# Patient Record
Sex: FEMALE | Race: WHITE | NOT HISPANIC OR LATINO | Employment: FULL TIME | URBAN - METROPOLITAN AREA
[De-identification: names, ages, dates, MRNs, and addresses within clinical notes are randomized per-mention and may not be internally consistent; named-entity substitution may affect disease eponyms.]

---

## 2024-03-25 ENCOUNTER — OCCUPATIONAL MEDICINE (OUTPATIENT)
Dept: URGENT CARE | Facility: CLINIC | Age: 34
End: 2024-03-25
Payer: COMMERCIAL

## 2024-03-25 VITALS
SYSTOLIC BLOOD PRESSURE: 116 MMHG | DIASTOLIC BLOOD PRESSURE: 64 MMHG | BODY MASS INDEX: 28.26 KG/M2 | WEIGHT: 149.69 LBS | TEMPERATURE: 99.4 F | HEART RATE: 76 BPM | OXYGEN SATURATION: 94 % | HEIGHT: 61 IN | RESPIRATION RATE: 14 BRPM

## 2024-03-25 DIAGNOSIS — S00.83XA FACIAL CONTUSION, INITIAL ENCOUNTER: ICD-10-CM

## 2024-03-25 PROCEDURE — 99203 OFFICE O/P NEW LOW 30 MIN: CPT | Performed by: NURSE PRACTITIONER

## 2024-03-25 PROCEDURE — 1125F AMNT PAIN NOTED PAIN PRSNT: CPT | Performed by: NURSE PRACTITIONER

## 2024-03-25 PROCEDURE — 3074F SYST BP LT 130 MM HG: CPT | Performed by: NURSE PRACTITIONER

## 2024-03-25 PROCEDURE — 3078F DIAST BP <80 MM HG: CPT | Performed by: NURSE PRACTITIONER

## 2024-03-25 ASSESSMENT — PAIN SCALES - GENERAL: PAINLEVEL: 2=MINIMAL-SLIGHT
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
## 2024-03-27 ENCOUNTER — OCCUPATIONAL MEDICINE (OUTPATIENT)
Dept: URGENT CARE | Facility: CLINIC | Age: 34
End: 2024-03-27
Payer: COMMERCIAL

## 2024-03-27 VITALS
OXYGEN SATURATION: 95 % | SYSTOLIC BLOOD PRESSURE: 118 MMHG | RESPIRATION RATE: 16 BRPM | BODY MASS INDEX: 28.39 KG/M2 | TEMPERATURE: 98.7 F | DIASTOLIC BLOOD PRESSURE: 68 MMHG | HEART RATE: 75 BPM | WEIGHT: 150.35 LBS | HEIGHT: 61 IN

## 2024-03-27 DIAGNOSIS — S00.83XD FACIAL CONTUSION, SUBSEQUENT ENCOUNTER: ICD-10-CM

## 2024-03-27 ASSESSMENT — PAIN SCALES - GENERAL: PAINLEVEL: 1=MINIMAL PAIN
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
## 2024-04-03 ENCOUNTER — APPOINTMENT (OUTPATIENT)
Dept: URGENT CARE | Facility: CLINIC | Age: 34
End: 2024-04-03
Payer: COMMERCIAL

## 2024-04-03 VITALS
OXYGEN SATURATION: 97 % | RESPIRATION RATE: 14 BRPM | TEMPERATURE: 98.9 F | HEART RATE: 82 BPM | SYSTOLIC BLOOD PRESSURE: 106 MMHG | BODY MASS INDEX: 28.3 KG/M2 | HEIGHT: 61 IN | DIASTOLIC BLOOD PRESSURE: 60 MMHG | WEIGHT: 149.91 LBS

## 2024-04-03 DIAGNOSIS — S00.83XD FACIAL CONTUSION, SUBSEQUENT ENCOUNTER: ICD-10-CM

## 2024-04-03 DIAGNOSIS — S06.0X0D CONCUSSION WITHOUT LOSS OF CONSCIOUSNESS, SUBSEQUENT ENCOUNTER: ICD-10-CM

## 2024-04-03 PROCEDURE — 3078F DIAST BP <80 MM HG: CPT | Performed by: PHYSICIAN ASSISTANT

## 2024-04-03 PROCEDURE — 99213 OFFICE O/P EST LOW 20 MIN: CPT | Performed by: PHYSICIAN ASSISTANT

## 2024-04-03 PROCEDURE — 3074F SYST BP LT 130 MM HG: CPT | Performed by: PHYSICIAN ASSISTANT
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
## 2024-04-10 ENCOUNTER — OCCUPATIONAL MEDICINE (OUTPATIENT)
Dept: URGENT CARE | Facility: CLINIC | Age: 34
End: 2024-04-10
Payer: COMMERCIAL

## 2024-04-10 VITALS
TEMPERATURE: 99.4 F | HEART RATE: 81 BPM | BODY MASS INDEX: 28.6 KG/M2 | RESPIRATION RATE: 12 BRPM | WEIGHT: 151.46 LBS | OXYGEN SATURATION: 96 % | HEIGHT: 61 IN | SYSTOLIC BLOOD PRESSURE: 108 MMHG | DIASTOLIC BLOOD PRESSURE: 60 MMHG

## 2024-04-10 DIAGNOSIS — S06.0X0D CONCUSSION WITHOUT LOSS OF CONSCIOUSNESS, SUBSEQUENT ENCOUNTER: Primary | ICD-10-CM

## 2024-04-10 DIAGNOSIS — S00.83XD FACIAL CONTUSION, SUBSEQUENT ENCOUNTER: ICD-10-CM

## 2024-04-10 PROCEDURE — 3074F SYST BP LT 130 MM HG: CPT | Performed by: PHYSICIAN ASSISTANT

## 2024-04-10 PROCEDURE — 3078F DIAST BP <80 MM HG: CPT | Performed by: PHYSICIAN ASSISTANT

## 2024-04-10 PROCEDURE — 99213 OFFICE O/P EST LOW 20 MIN: CPT | Performed by: PHYSICIAN ASSISTANT

## 2024-04-10 PROCEDURE — 1125F AMNT PAIN NOTED PAIN PRSNT: CPT | Performed by: PHYSICIAN ASSISTANT

## 2024-04-10 ASSESSMENT — PAIN SCALES - GENERAL: PAINLEVEL: 1=MINIMAL PAIN
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
## 2024-04-10 NOTE — LETTER
PHYSICIAN’S AND CHIROPRACTIC PHYSICIAN'S                       PROGRESS REPORT  CERTIFICATION OF DISABILITY Claim Number:        Social Security Number:     Patient’s Name:Yen Alvarado Date of Injury:  3/25/2024     Employer:  [employer]  Name of O (if applicable)   Patient’s Job Description/Occupation:  Teacher    Previous Injuries/Diseases/Surgeries Contributing to the Condition:      Diagnosis:  The primary encounter diagnosis was Concussion without loss of consciousness, subsequent encounter. A diagnosis of Facial contusion, subsequent encounter was also pertinent to this visit.   Related to the Industrial Injury? Explain:  Yes    Objective Medical Findings:  Physical exam: Cranial nerves II through XII grossly intact, PERRLA, EOMI bilaterally, no slurred speech, no facial droop.  Full range of motion of neck in all planes of movement without pain.  Bilateral upper and lower extremity show full range of motion with good coordination, 5 out of 5 strength.  Patient walks with normal gait, good balance.    []  None - Discharged                         Stable     []  Yes     []  No                           Ratable     []  Yes     []  No   []  Generally Improved                        []  Condition Worsened                              []  Condition Same         May Have Suffered a Permanent Disability     []  Yes     []  No   Treatment Plan: Medication  Comments:Patient can continue OTC Advil dual action for headaches, this seems to work the best for the patient.     [] No Change in Therapy                    [] PT/OT Prescribed                   [] Medication May be Used While Working    [] Case Management                          [] PT/OT Discontinued  []  Consultation    [] Further Diagnostic Studies    []  Prescription(s)                        [] Released to FULL DUTY /No Restrictions on (Date):                                                                                            [] Certified TOTALLY TEMPORARILY DISABLED (Indicate Dates): From:                       To:                               [] Released to RESTRICTED/Modified Duty on (Date): From:                              To:                                                                   Restrictions Are:     []  Permanent[]  Temporary   [] No Sitting                   []  No Standing          []  No Pulling             []  Other:                                              [] No Bending at Waist  []  No Stooping          []  No Lifting                                                                            [] No Carrying                []  No Walking           []  Lifting Restricted to (lbs.):   [] No Pushing                 []  No Climbing         []  No Reaching Above Shoulders   Date of Next Visit: 4/19/2024 Date of this Exam:  4/10/2024 Physician/Chiropractic Physician Name: Jackie Lowe P.A.-C. Physician/Chiropractic Physician Signature:      D-39 (Rev. 2/24)

## 2024-04-10 NOTE — LETTER
PHYSICIAN’S AND CHIROPRACTIC PHYSICIAN'S                       PROGRESS REPORT  CERTIFICATION OF DISABILITY Claim Number:        Social Security Number:     Patient’s Name:Yen Alvarado Date of Injury:  3/25/2024     Employer:  [employer] *** Name of O (if applicable)   Patient’s Job Description/Occupation:  Teacher ***   Previous Injuries/Diseases/Surgeries Contributing to the Condition:      Diagnosis:  The primary encounter diagnosis was Concussion without loss of consciousness, subsequent encounter. A diagnosis of Facial contusion, subsequent encounter was also pertinent to this visit.   Related to the Industrial Injury? Explain:  Yes ***   Objective Medical Findings:  Physical exam: Cranial nerves II through XII grossly intact, PERRLA, EOMI bilaterally, no slurred speech, no facial droop.  Full range of motion of neck in all planes of movement without pain.  Bilateral upper and lower extremity show full range of motion with good coordination, 5 out of 5 strength.  Patient walks with normal gait, good balance.    []  None - Discharged                         Stable     []  Yes     []  No                           Ratable     []  Yes     []  No   []  Generally Improved                        []  Condition Worsened                              []  Condition Same         May Have Suffered a Permanent Disability     []  Yes     []  No   Treatment Plan: Medication  Comments:Patient can continue OTC Advil dual action for headaches, this seems to work the best for the patient.     [] No Change in Therapy                    [] PT/OT Prescribed                   [] Medication May be Used While Working    [] Case Management                          [] PT/OT Discontinued  []  Consultation    [] Further Diagnostic Studies    []  Prescription(s)                        [] Released to FULL DUTY /No Restrictions on (Date):                                                                                            [] Certified TOTALLY TEMPORARILY DISABLED (Indicate Dates): From:                       To:                               [] Released to RESTRICTED/Modified Duty on (Date): From:                              To:                                                                   Restrictions Are:     []  Permanent[]  Temporary   [] No Sitting                   []  No Standing          []  No Pulling             []  Other:                                              [] No Bending at Waist  []  No Stooping          []  No Lifting                                                                            [] No Carrying                []  No Walking           []  Lifting Restricted to (lbs.):   [] No Pushing                 []  No Climbing         []  No Reaching Above Shoulders   Date of Next Visit: 4/19/2024 Date of this Exam:  4/10/2024 Physician/Chiropractic Physician Name: Jackie Lowe P.A.-C. Physician/Chiropractic Physician Signature:      D-39 (Rev. 2/24)

## 2024-04-10 NOTE — LETTER
PHYSICIAN’S AND CHIROPRACTIC PHYSICIAN'S                       PROGRESS REPORT  CERTIFICATION OF DISABILITY Claim Number:        Social Security Number:     Patient’s Name:Yen Alvarado Date of Injury:  3/25/2024     Employer:  [employer] *** Name of O (if applicable)   Patient’s Job Description/Occupation:  Teacher ***   Previous Injuries/Diseases/Surgeries Contributing to the Condition:      Diagnosis:  The primary encounter diagnosis was Concussion without loss of consciousness, subsequent encounter. A diagnosis of Facial contusion, subsequent encounter was also pertinent to this visit.   Related to the Industrial Injury? Explain:  Yes ***   Objective Medical Findings:  Physical exam: Cranial nerves II through XII grossly intact, PERRLA, EOMI bilaterally, no slurred speech, no facial droop.  Full range of motion of neck in all planes of movement without pain.  Bilateral upper and lower extremity show full range of motion with good coordination, 5 out of 5 strength.  Patient walks with normal gait, good balance.    []  None - Discharged                         Stable     []  Yes     []  No                           Ratable     []  Yes     []  No   []  Generally Improved                        []  Condition Worsened                              []  Condition Same         May Have Suffered a Permanent Disability     []  Yes     []  No   Treatment Plan: Medication  Comments:Patient can continue OTC Advil dual action for headaches, this seems to work the best for the patient.     [] No Change in Therapy                    [] PT/OT Prescribed                   [] Medication May be Used While Working    [] Case Management                          [] PT/OT Discontinued  []  Consultation    [] Further Diagnostic Studies    []  Prescription(s)                        [] Released to FULL DUTY /No Restrictions on (Date):                                                                                            [] Certified TOTALLY TEMPORARILY DISABLED (Indicate Dates): From:                       To:                               [] Released to RESTRICTED/Modified Duty on (Date): From:                              To:                                                                   Restrictions Are:     []  Permanent[]  Temporary   [] No Sitting                   []  No Standing          []  No Pulling             []  Other:                                              [] No Bending at Waist  []  No Stooping          []  No Lifting                                                                            [] No Carrying                []  No Walking           []  Lifting Restricted to (lbs.):   [] No Pushing                 []  No Climbing         []  No Reaching Above Shoulders   Date of Next Visit: 4/19/2024 Date of this Exam:  4/10/2024 Physician/Chiropractic Physician Name: Jackie Lowe P.A.-C. Physician/Chiropractic Physician Signature:      D-39 (Rev. 2/24)

## 2024-04-10 NOTE — LETTER
PHYSICIAN’S AND CHIROPRACTIC PHYSICIAN'S                       PROGRESS REPORT  CERTIFICATION OF DISABILITY Claim Number:        Social Security Number:     Patient’s Name:Yen Alvarado Date of Injury:  3/25/2024     Employer:  [employer]  Name of O (if applicable)   Patient’s Job Description/Occupation:  Teacher    Previous Injuries/Diseases/Surgeries Contributing to the Condition:      Diagnosis:  The primary encounter diagnosis was Concussion without loss of consciousness, subsequent encounter. A diagnosis of Facial contusion, subsequent encounter was also pertinent to this visit.   Related to the Industrial Injury? Explain:  Yes    Objective Medical Findings:  Physical exam: Cranial nerves II through XII grossly intact, PERRLA, EOMI bilaterally, no slurred speech, no facial droop.  Full range of motion of neck in all planes of movement without pain.  Bilateral upper and lower extremity show full range of motion with good coordination, 5 out of 5 strength.  Patient walks with normal gait, good balance.    []  None - Discharged                         Stable     []  Yes     []  No                           Ratable     []  Yes     []  No   []  Generally Improved                        []  Condition Worsened                              []  Condition Same         May Have Suffered a Permanent Disability     []  Yes     []  No   Treatment Plan: Medication  Comments:Patient can continue OTC Advil dual action for headaches, this seems to work the best for the patient.     [] No Change in Therapy                    [] PT/OT Prescribed                   [] Medication May be Used While Working    [] Case Management                          [] PT/OT Discontinued  []  Consultation    [] Further Diagnostic Studies    []  Prescription(s)                        [] Released to FULL DUTY /No Restrictions on (Date):                                                                                            [] Certified TOTALLY TEMPORARILY DISABLED (Indicate Dates): From:                       To:                               [] Released to RESTRICTED/Modified Duty on (Date): From:                              To:                                                                   Restrictions Are:     []  Permanent[]  Temporary   [] No Sitting                   []  No Standing          []  No Pulling             []  Other:                                              [] No Bending at Waist  []  No Stooping          []  No Lifting                                                                            [] No Carrying                []  No Walking           []  Lifting Restricted to (lbs.):   [] No Pushing                 []  No Climbing         []  No Reaching Above Shoulders   Date of Next Visit: 4/19/2024 Date of this Exam:  4/10/2024 Physician/Chiropractic Physician Name: Jackie Lowe P.A.-C. Physician/Chiropractic Physician Signature:   Zion Tello DO MPH   D-39 (Rev. 2/24)

## 2024-04-10 NOTE — LETTER
PHYSICIAN’S AND CHIROPRACTIC PHYSICIAN'S                       PROGRESS REPORT  CERTIFICATION OF DISABILITY Claim Number:        Social Security Number:     Patient’s Name:Yen Alvarado Date of Injury:  3/25/2024     Employer:  [employer]  Name of O (if applicable)   Patient’s Job Description/Occupation:  Teacher    Previous Injuries/Diseases/Surgeries Contributing to the Condition:      Diagnosis:  The primary encounter diagnosis was Concussion without loss of consciousness, subsequent encounter. A diagnosis of Facial contusion, subsequent encounter was also pertinent to this visit.   Related to the Industrial Injury? Explain:  Yes    Objective Medical Findings:  Physical exam: Cranial nerves II through XII grossly intact, PERRLA, EOMI bilaterally, no slurred speech, no facial droop.  Full range of motion of neck in all planes of movement without pain.  Bilateral upper and lower extremity show full range of motion with good coordination, 5 out of 5 strength.  Patient walks with normal gait, good balance.    []  None - Discharged                         Stable     [x]  Yes     []  No                           Ratable     []  Yes     []  No   [x]  Generally Improved                        []  Condition Worsened                              []  Condition Same         May Have Suffered a Permanent Disability     []  Yes     []  No   Treatment Plan: Medication  Comments:Patient can continue OTC Advil dual action for headaches, this seems to work the best for the patient.  Patient encouraged to continue taking over-the-counter medications for her headaches.  Patient was also encouraged to continue keeping screen time to a minimum as much as possible.  Patient also encouraged to wear a hat while outside as the light still is bothersome, I also suggested patient may be wear a baseball cap inside while she is teaching as this would help shade her eyes.  Patient sleeps with  the television on, I encouraged her to turn that off as well is that is an auditory stimulus even while she is asleep which might be bothersome and slow her concussion recovery.  This is patient's fourth visit, I feel her next follow-up appointment needs to be at occupational medicine, referral has been placed for transfer of care.     [] No Change in Therapy                    [] PT/OT Prescribed                   [] Medication May be Used While Working    [] Case Management                          [] PT/OT Discontinued  [x]  Consultation    [] Further Diagnostic Studies    []  Prescription(s)                        [x] Released to FULL DUTY /No Restrictions on (Date):                                                                                           [] Certified TOTALLY TEMPORARILY DISABLED (Indicate Dates): From:                       To:                               [] Released to RESTRICTED/Modified Duty on (Date): From:                              To:                                                                   Restrictions Are:     []  Permanent[]  Temporary   [] No Sitting                   []  No Standing          []  No Pulling             []  Other:                                              [] No Bending at Waist  []  No Stooping          []  No Lifting                                                                            [] No Carrying                []  No Walking           []  Lifting Restricted to (lbs.):   [] No Pushing                 []  No Climbing         []  No Reaching Above Shoulders   Date of Next Visit: 4/19/2024 Date of this Exam:  4/10/2024 Physician/Chiropractic Physician Name: Jackie Lowe P.A.-C. Physician/Chiropractic Physician Signature:   Zion Tello DO MPH   D-39 (Rev. 2/24)

## 2024-04-10 NOTE — LETTER
PHYSICIAN’S AND CHIROPRACTIC PHYSICIAN'S                       PROGRESS REPORT  CERTIFICATION OF DISABILITY Claim Number:        Social Security Number:     Patient’s Name:Yen Alvarado Date of Injury:  3/25/2024     Employer:  [employer] *** Name of O (if applicable)   Patient’s Job Description/Occupation:  Teacher ***   Previous Injuries/Diseases/Surgeries Contributing to the Condition:      Diagnosis:  The primary encounter diagnosis was Concussion without loss of consciousness, subsequent encounter. A diagnosis of Facial contusion, subsequent encounter was also pertinent to this visit.   Related to the Industrial Injury? Explain:  Yes ***   Objective Medical Findings:  Physical exam: Cranial nerves II through XII grossly intact, PERRLA, EOMI bilaterally, no slurred speech, no facial droop.  Full range of motion of neck in all planes of movement without pain.  Bilateral upper and lower extremity show full range of motion with good coordination, 5 out of 5 strength.  Patient walks with normal gait, good balance.    []  None - Discharged                         Stable     []  Yes     []  No                           Ratable     []  Yes     []  No   []  Generally Improved                        []  Condition Worsened                              []  Condition Same         May Have Suffered a Permanent Disability     []  Yes     []  No   Treatment Plan: Medication  Comments:Patient can continue OTC Advil dual action for headaches, this seems to work the best for the patient.     [] No Change in Therapy                    [] PT/OT Prescribed                   [] Medication May be Used While Working    [] Case Management                          [] PT/OT Discontinued  []  Consultation    [] Further Diagnostic Studies    []  Prescription(s)                        [] Released to FULL DUTY /No Restrictions on (Date):                                                                                            [] Certified TOTALLY TEMPORARILY DISABLED (Indicate Dates): From:                       To:                               [] Released to RESTRICTED/Modified Duty on (Date): From:                              To:                                                                   Restrictions Are:     []  Permanent[]  Temporary   [] No Sitting                   []  No Standing          []  No Pulling             []  Other:                                              [] No Bending at Waist  []  No Stooping          []  No Lifting                                                                            [] No Carrying                []  No Walking           []  Lifting Restricted to (lbs.):   [] No Pushing                 []  No Climbing         []  No Reaching Above Shoulders   Date of Next Visit: 4/19/2024 Date of this Exam:  4/10/2024 Physician/Chiropractic Physician Name: Jackie Lowe P.A.-C. Physician/Chiropractic Physician Signature:      D-39 (Rev. 2/24)

## 2024-04-19 ENCOUNTER — OCCUPATIONAL MEDICINE (OUTPATIENT)
Dept: OCCUPATIONAL MEDICINE | Facility: CLINIC | Age: 34
End: 2024-04-19
Payer: COMMERCIAL

## 2024-04-19 VITALS
RESPIRATION RATE: 12 BRPM | SYSTOLIC BLOOD PRESSURE: 114 MMHG | BODY MASS INDEX: 28.51 KG/M2 | HEIGHT: 61 IN | DIASTOLIC BLOOD PRESSURE: 70 MMHG | HEART RATE: 64 BPM | WEIGHT: 151 LBS | TEMPERATURE: 97.9 F | OXYGEN SATURATION: 97 %

## 2024-04-19 DIAGNOSIS — S06.0X0D CONCUSSION WITHOUT LOSS OF CONSCIOUSNESS, SUBSEQUENT ENCOUNTER: ICD-10-CM

## 2024-04-19 PROCEDURE — 3078F DIAST BP <80 MM HG: CPT | Performed by: PREVENTIVE MEDICINE

## 2024-04-19 PROCEDURE — 3074F SYST BP LT 130 MM HG: CPT | Performed by: PREVENTIVE MEDICINE

## 2024-04-19 PROCEDURE — 99203 OFFICE O/P NEW LOW 30 MIN: CPT | Performed by: PREVENTIVE MEDICINE

## 2024-04-19 NOTE — LETTER
PHYSICIAN’S AND CHIROPRACTIC PHYSICIAN'S                       PROGRESS REPORT  CERTIFICATION OF DISABILITY Claim Number:        Social Security Number:     Patient’s Name:Yen Alvarado Date of Injury:  3/25/2024     Employer:  [employer]  Name of O (if applicable)   Patient’s Job Description/Occupation:  Teacher    Previous Injuries/Diseases/Surgeries Contributing to the Condition:      Diagnosis:  (S06.0X0D) Concussion without loss of consciousness, subsequent encounterThe encounter diagnosis was Concussion without loss of consciousness, subsequent encounter.   Related to the Industrial Injury? Yes   Explain:   Objective Medical Findings: Constitutional: Patient is in no acute distress. Appears well-developed and well-nourished.   HENT: Normocephalic and atraumatic. EOM are normal. No scleral icterus.   Neurological: Patient is alert and oriented to person, place, and time.   Skin: Skin is warm and dry.   Psychiatric: Normal mood and affect. Behavior is normal.         []  None - Discharged                         Stable     []  Yes     []  No                           Ratable     []  Yes     []  No   [x]  Generally Improved                        []  Condition Worsened                              []  Condition Same         May Have Suffered a Permanent Disability     []  Yes     []  No   Treatment Plan: Continue conservative care at this time  Continue OTC Aleve dual action as needed for headaches  Recommend taking breaks from screens as needed to prevent headaches  Full duty  Follow-up 3 weeks     [x] No Change in Therapy                    [] PT/OT Prescribed                   [] Medication May be Used While Working    [] Case Management                          [] PT/OT Discontinued  []  Consultation    [] Further Diagnostic Studies    []  Prescription(s)                        [x] Released to FULL DUTY /No Restrictions on (Date):             4/19/24                                                                               [] Certified TOTALLY TEMPORARILY DISABLED (Indicate Dates): From:                       To:                               [] Released to RESTRICTED/Modified Duty on (Date): From:                              To:                                                                   Restrictions Are:     []  Permanent[]  Temporary   [] No Sitting                   []  No Standing          []  No Pulling             []  Other:                                              [] No Bending at Waist  []  No Stooping          []  No Lifting                                                                            [] No Carrying                []  No Walking           []  Lifting Restricted to (lbs.):   [] No Pushing                 []  No Climbing         []  No Reaching Above Shoulders   Date of Next Visit: 5/10/2024 @ 10:00 AM Date of this Exam:  4/19/2024 Physician/Chiropractic Physician Name: Zion Tello D.O. Physician/Chiropractic Physician Signature:   Zion Tello DO MPH   D-39 (Rev. 2/24)
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
## 2024-05-10 ENCOUNTER — OCCUPATIONAL MEDICINE (OUTPATIENT)
Dept: OCCUPATIONAL MEDICINE | Facility: CLINIC | Age: 34
End: 2024-05-10
Payer: COMMERCIAL

## 2024-05-10 VITALS
RESPIRATION RATE: 16 BRPM | SYSTOLIC BLOOD PRESSURE: 112 MMHG | HEIGHT: 61 IN | HEART RATE: 69 BPM | BODY MASS INDEX: 28.09 KG/M2 | TEMPERATURE: 97.5 F | OXYGEN SATURATION: 97 % | DIASTOLIC BLOOD PRESSURE: 82 MMHG | WEIGHT: 148.8 LBS

## 2024-05-10 DIAGNOSIS — S06.0X0D CONCUSSION WITHOUT LOSS OF CONSCIOUSNESS, SUBSEQUENT ENCOUNTER: ICD-10-CM

## 2024-05-10 PROCEDURE — 3074F SYST BP LT 130 MM HG: CPT | Performed by: PREVENTIVE MEDICINE

## 2024-05-10 PROCEDURE — 99213 OFFICE O/P EST LOW 20 MIN: CPT | Performed by: PREVENTIVE MEDICINE

## 2024-05-10 PROCEDURE — 3079F DIAST BP 80-89 MM HG: CPT | Performed by: PREVENTIVE MEDICINE
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
## 2024-09-21 ENCOUNTER — HOSPITAL ENCOUNTER (OUTPATIENT)
Dept: RADIOLOGY | Facility: MEDICAL CENTER | Age: 34
End: 2024-09-21
Attending: FAMILY MEDICINE
Payer: COMMERCIAL

## 2024-09-21 DIAGNOSIS — E04.9 ENLARGEMENT OF THYROID: ICD-10-CM

## 2024-09-21 PROCEDURE — 76536 US EXAM OF HEAD AND NECK: CPT
